# Patient Record
Sex: FEMALE | Race: WHITE | URBAN - METROPOLITAN AREA
[De-identification: names, ages, dates, MRNs, and addresses within clinical notes are randomized per-mention and may not be internally consistent; named-entity substitution may affect disease eponyms.]

---

## 2017-10-04 ENCOUNTER — HISTORICAL (OUTPATIENT)
Dept: PREADMISSION TESTING | Facility: HOSPITAL | Age: 32
End: 2017-10-04

## 2017-10-04 LAB
ABS NEUT (OLG): 1.58 X10(3)/MCL (ref 2.1–9.2)
APTT PPP: 26.9 SECOND(S) (ref 24.8–36.9)
BASOPHILS # BLD AUTO: 0 X10(3)/MCL (ref 0–0.2)
BASOPHILS NFR BLD AUTO: 1 %
EOSINOPHIL # BLD AUTO: 0.1 X10(3)/MCL (ref 0–0.9)
EOSINOPHIL NFR BLD AUTO: 2 %
ERYTHROCYTE [DISTWIDTH] IN BLOOD BY AUTOMATED COUNT: 12.2 % (ref 11.5–17)
HCT VFR BLD AUTO: 41.3 % (ref 37–47)
HGB BLD-MCNC: 13.7 GM/DL (ref 12–16)
INR PPP: 0.96 (ref 0–1.27)
LYMPHOCYTES # BLD AUTO: 2.4 X10(3)/MCL (ref 0.6–4.6)
LYMPHOCYTES NFR BLD AUTO: 52 %
MCH RBC QN AUTO: 32.9 PG (ref 27–31)
MCHC RBC AUTO-ENTMCNC: 33.2 GM/DL (ref 33–36)
MCV RBC AUTO: 99.3 FL (ref 80–94)
MONOCYTES # BLD AUTO: 0.5 X10(3)/MCL (ref 0.1–1.3)
MONOCYTES NFR BLD AUTO: 11 %
NEUTROPHILS # BLD AUTO: 1.58 X10(3)/MCL (ref 1.4–7.9)
NEUTROPHILS NFR BLD AUTO: 34 %
PLATELET # BLD AUTO: 248 X10(3)/MCL (ref 130–400)
PMV BLD AUTO: 8.8 FL (ref 9.4–12.4)
PROTHROMBIN TIME: 12.6 SECOND(S) (ref 12.2–14.7)
RBC # BLD AUTO: 4.16 X10(6)/MCL (ref 4.2–5.4)
WBC # SPEC AUTO: 4.7 X10(3)/MCL (ref 4.5–11.5)

## 2017-10-17 ENCOUNTER — HISTORICAL (OUTPATIENT)
Dept: ADMINISTRATIVE | Facility: HOSPITAL | Age: 32
End: 2017-10-17

## 2022-04-30 NOTE — OP NOTE
DATE OF SURGERY:    10/17/2017    SURGEON:  Alexander Zavala MD    PREOPERATIVE DIAGNOSES:  Chronic recurrent pansinusitis, intranasal and intrasinus polyposis, nasal septal deviation, turbinate hypertrophy.    PROCEDURES:  Functional endoscopic sinus surgery, bilateral anterior and posterior ethmoidectomy, maxillary antrostomy, septoplasty, turbinoplasty, frontal sinus exploration, sphenoidotomy, submucosal resection inferior turbinate and rhinoplasty.    PROCEDURE IN DETAIL:  With proper consent and information, the patient was brought to the operating room and placed on the operating table in the supine position.  After satisfactory endotracheal intubation and general anesthesia, the patient was placed asleep.  The nasopharynx was packed with 200 mg of cocaine.  The septum and sinuses were injected with approximately 2 cc of 2% Xylocaine with epinephrine.  A left hemitransfixion incision was made and the mucoperichondrium was elevated off both sides of the septum.  This was brought back to the perpendicular plate of the ethmoid.  The deviation of the nasal septum and perpendicular plate were removed, leaving a caudal and dorsal strut of approximately 1 cm.  The mucoperichondrium was reapproximated with 4-0 plain catgut and 5-0 chromic.       Attention was then placed to the sinuses.  The nose  was explored endoscopically.  An infundibulotomy was done on both sides.  The anterior sinus was opened.  There was a significant amount of polypoid material and hypertrophic chronically infected mucosal membranes.  Anterior ethmoid was done.  The basal lamella was opened.  The posterior ethmoid sinuses were involved with chronic mucosal changes consistent with chronic sinusitis.  This was completely removed up to the fovea ethmoidalis.  That section was carried up superiorly into the frontal ethmoid recess.  The frontal sinus was explored and mucosal antral disease was removed from this area with a mucosal  sparing technique.  The nasal frontal duct was connected to the posterior ethmoid sinus in the usual fashion.  The anterior wall of the sphenoid sinus dissection was carried inferiorly to the anterior wall of the sphenoid sinus which was identified and the sphenoid ostium was opened.  There was mucosal antral disease and obstructive inflammatory changes and tissue in this area which was removed and cleaned out.  Using the backbiting forceps and ring curette, the maxillary sinuses were opened widely.  There was a large amount of purulent material and disease tissue that was removed.     This was all sent for pathologic evaluation.  An identical procedure was carried out on the opposite side.  Bilateral submucosal resection of the inferior turbinates was done sharply and all hypertrophic changes were removed.  The nasal sinuses were packed with Fibrin glue and Nasopore.  The patient tolerated the procedure very well.  There were no intra-operative bleeding problems or CSF leak.  The patient was transferred back to the recovery room awake, alert and responsive.  Tissue was removed from all of the offending sinuses and sent for final pathologic specimen.       At the end of the operation, a mid columellar notch incision was made.  Lower rim incision was made.  The nose was degloved staying densely adherent to the lower lateral cartilage.  The dorsum of the nose was rasped down to more anatomically pleasing straight line position.  Cephalic margin of the lower lateral cartilage was trimmed leaving approximately 11 mm of lower lateral cartilage.  Vertical double dome technique was used to create the lower lateral cartilages.  Complete transfixion incision was used to project the nose.  This was reapproximated with 5-0 chromic.  At that point, lateral osteotomies were done.  The right lateral osteotomy was somewhat hindered by the septal deviation superiorly.  This was accomplished by using an osteotome in this area.  At  that point, the nose was fractured in midline position.  She tolerated the procedure very well.  It was meticulously closed with 5-0 nylon and 5-0 chromic suture, and [place] splint was placed on the patient's nose.  She tolerated the procedure very well.  She was transferred back to Recovery Room awake, alert and responsive.        ______________________________  MD KAI Sloan/JULIO CÉSAR  DD:  10/17/2017  Time:  11:20AM  DT:  10/17/2017  Time:  03:25PM  Job #:  [id number]